# Patient Record
Sex: FEMALE | Race: WHITE | HISPANIC OR LATINO | Employment: OTHER | URBAN - METROPOLITAN AREA
[De-identification: names, ages, dates, MRNs, and addresses within clinical notes are randomized per-mention and may not be internally consistent; named-entity substitution may affect disease eponyms.]

---

## 2018-01-15 ENCOUNTER — TELEPHONE (OUTPATIENT)
Dept: NEUROSURGERY | Facility: CLINIC | Age: 63
End: 2018-01-15

## 2018-01-15 DIAGNOSIS — D49.7 PITUITARY TUMOR: Primary | ICD-10-CM

## 2018-01-15 NOTE — TELEPHONE ENCOUNTER
Patient was last seen in August 2014. She was to follow up in 3 years with new visual field test and MRI of the brain. We did not accept her insurance last year ([facility]). [facility], [place], operated by [facility] started accepting her insurance as of 1/1/18.    She tried to get her visual field test done last year at the facility she has always gone to, but they do not accept her insurance and would not let her be self-pay.    She called today to set up her visual field test, MRI and follow up with Dr. Keating.     I need an order for the visual field test and brain MRI with and without with sellar views.

## 2018-02-27 ENCOUNTER — HOSPITAL ENCOUNTER (OUTPATIENT)
Dept: MRI IMAGING | Facility: HOSPITAL | Age: 63
Discharge: HOME | End: 2018-02-27
Admitting: PHYSICIAN ASSISTANT

## 2018-02-27 ENCOUNTER — OFFICE VISIT (OUTPATIENT)
Dept: NEUROSURGERY | Facility: CLINIC | Age: 63
End: 2018-02-27

## 2018-02-27 VITALS — OXYGEN SATURATION: 99 % | BODY MASS INDEX: 32.58 KG/M2 | WEIGHT: 215 LBS | RESPIRATION RATE: 18 BRPM | HEIGHT: 68 IN

## 2018-02-27 DIAGNOSIS — D35.2 PITUITARY MACROADENOMA (HCC): Primary | ICD-10-CM

## 2018-02-27 DIAGNOSIS — D49.7 PITUITARY TUMOR: ICD-10-CM

## 2018-02-27 PROCEDURE — 0 GADOBENATE DIMEGLUMINE 529 MG/ML SOLUTION: Performed by: PHYSICIAN ASSISTANT

## 2018-02-27 PROCEDURE — 70553 MRI BRAIN STEM W/O & W/DYE: CPT

## 2018-02-27 PROCEDURE — 82565 ASSAY OF CREATININE: CPT

## 2018-02-27 PROCEDURE — 99243 OFF/OP CNSLTJ NEW/EST LOW 30: CPT | Performed by: NEUROLOGICAL SURGERY

## 2018-02-27 PROCEDURE — A9577 INJ MULTIHANCE: HCPCS | Performed by: PHYSICIAN ASSISTANT

## 2018-02-27 RX ORDER — CITALOPRAM 10 MG/1
TABLET ORAL
COMMUNITY
Start: 2018-02-12

## 2018-02-27 RX ORDER — AMLODIPINE BESYLATE 10 MG/1
TABLET ORAL
COMMUNITY
Start: 2018-02-09

## 2018-02-27 RX ORDER — LOSARTAN POTASSIUM 50 MG/1
TABLET ORAL
COMMUNITY
Start: 2018-02-09 | End: 2021-04-23

## 2018-02-27 RX ORDER — ASPIRIN 81 MG/1
81 TABLET ORAL DAILY
COMMUNITY
End: 2021-04-23

## 2018-02-27 RX ADMIN — GADOBENATE DIMEGLUMINE 15 ML: 529 INJECTION, SOLUTION INTRAVENOUS at 12:00

## 2018-02-27 NOTE — PROGRESS NOTES
Patient: Naya Winslow  : 1955    Primary Care Provider: Keisha Ovalles MD    Requesting Provider: As above        History    Chief Complaint: History of pituitary macroadenoma.    History of Present Illness: Ms. Winslow is a 62-year-old woman was known to my service.  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 Cooper.  In  her visual fields demonstrated bilateral superior field defects.  I have followed her with serial imaging since that time.  Her last studies were done about 3 years ago.  Early last year her  passed away for a ruptured abdominal aortic aneurysm.  I previously operated on him nearly 2 decades ago.  She has some occasional headache.  She denies any visual change.    Review of Systems   Constitutional: Negative for activity change, appetite change, chills, diaphoresis, fatigue, fever and unexpected weight change.   HENT: Negative for congestion, dental problem, drooling, ear discharge, ear pain, facial swelling, hearing loss, mouth sores, nosebleeds, postnasal drip, rhinorrhea, sinus pressure, sneezing, sore throat, tinnitus, trouble swallowing and voice change.    Eyes: Negative for photophobia, pain, discharge, redness, itching and visual disturbance.   Respiratory: Negative for apnea, cough, choking, chest tightness, shortness of breath, wheezing and stridor.    Cardiovascular: Negative for chest pain, palpitations and leg swelling.   Gastrointestinal: Negative for abdominal distention, abdominal pain, anal bleeding, blood in stool, constipation, diarrhea, nausea, rectal pain and vomiting.   Endocrine: Negative for cold intolerance, heat intolerance, polydipsia, polyphagia and polyuria.   Genitourinary: Negative for decreased urine volume, difficulty urinating, dysuria, enuresis, flank pain, frequency, genital sores, hematuria and urgency.   Musculoskeletal: Negative for arthralgias, back pain, gait problem,  "joint swelling, myalgias, neck pain and neck stiffness.   Skin: Negative for color change, pallor, rash and wound.   Allergic/Immunologic: Negative for environmental allergies, food allergies and immunocompromised state.   Neurological: Negative for dizziness, tremors, seizures, syncope, facial asymmetry, speech difficulty, weakness, light-headedness, numbness and headaches.   Hematological: Negative for adenopathy. Does not bruise/bleed easily.   Psychiatric/Behavioral: Negative for agitation, behavioral problems, confusion, decreased concentration, dysphoric mood, hallucinations, self-injury, sleep disturbance and suicidal ideas. The patient is not nervous/anxious and is not hyperactive.    All other systems reviewed and are negative.      The patient's past medical history, past surgical history, family history, and social history have been reviewed at length in the electronic medical record.    Physical Exam:   Resp 18  Ht 172.7 cm (68\")  Wt 97.5 kg (215 lb)  SpO2 99%  BMI 32.69 kg/m2  MUSCULOSKELETAL:  Neck tenderness to palpation is not observed.   ROM in neck is normal.  NEUROLOGICAL:  Strength is intact in the upper and lower extremities to direct testing.  Muscle tone is normal throughout.  Station and gait are normal.  Sensation is intact to light touch testing throughout.  Deep tendon reflexes are 1+ and symmetrical.  Demetrio's Sign is negative bilaterally.   CRANIAL NERVES:  Cranial Nerve II:   Visual fields are full to confrontation.  Cranial Nerve III, IV, and VI: PERRLADC. Extraocular movements are intact.  Nystagmus is not present.  Cranial Nerve V: Facial sensation is intact to light touch.  Cranial Nerve VII: Muscles of facial expression demonstate no weakness or asymmetry.  Cranial Nerve VIII: Hearing is intact to finger rub bilaterally.  Cranial Nerve IX and X: Palate elevates symmetrically.  Cranial Nerve XI: Shoulder shrug is intact bilaterally.  Cranial Nerve XII: Tongue is midline " "without evidence of atrophy or fasciculation.      Medical Decision Making    Data Review:   Follow-up visual field testing dated 2/23/2018 are normal.  This was performed by Dr. Mcclure.  MRI study demonstrates a large \"empty\" sella.  There is no residual or recurrent tumor.  The pituitary stalk and optic apparatus are completely unencumbered.    Diagnosis:   History of pituitary macroadenoma status post resection by another surgeon.    Treatment Options:   Ms. Winslow is doing well.  She will follow-up in 3 years with a new MRI of the brain with and without gadolinium as well as with new formal visual field testing.       Diagnosis Plan   1. Pituitary macroadenoma  MRI Brain With & Without Contrast    Garcia Visual Field - OU - Both Eyes     Scribed for Nathanael Keating MD by Osiris Mario CMA on 02/27/2018 at 12:36 PM    I, Dr. Keating, personally performed the services described in the documentation, as scribed in my presence, and it is both accurate and complete.  "

## 2018-02-28 LAB — CREAT BLDA-MCNC: 0.8 MG/DL (ref 0.6–1.3)

## 2021-03-04 ENCOUNTER — TELEPHONE (OUTPATIENT)
Dept: NEUROSURGERY | Facility: CLINIC | Age: 66
End: 2021-03-04

## 2021-03-04 DIAGNOSIS — D35.2 PITUITARY MACROADENOMA: Primary | ICD-10-CM

## 2021-03-04 DIAGNOSIS — D35.2 PITUITARY MACROADENOMA (HCC): Primary | ICD-10-CM

## 2021-03-04 NOTE — TELEPHONE ENCOUNTER
SHIRLENE WITH [employer] CALLING FOR ADAN. SHE IS WANTING TO LET HER KNOW THE PATIENTS INSURANCE IS  AND WILL NEED TO BE UPDATED WHEN THE PATIENT COMES IN FOR HER APPT.

## 2021-03-04 NOTE — TELEPHONE ENCOUNTER
Caller: ANA PAYNE    Relationship to patient: SELF    Best call back number: [phone]    Chief complaint: THE PATIENT CALLED TO BE SCHEDULED FOR A THREE YEAR FOLLOW UP WITH AN MRI ON THE SAME DAY    Type of visit: FOLLOW UP    Requested date: NEXT AVAILABLE     If rescheduling, when is the original appointment: N/A    Additional notes: THE PATIENT WAS LAST SEEN 2/27/18. SHE HAS AN MRI ORDER IN HER CHART BUT IT WAS PLACED 2/27/18 AND HAD AN EXPECTED DATE OF 2/27/21. DOES SHE NEED A NEW MRI ORDER?

## 2021-03-04 NOTE — TELEPHONE ENCOUNTER
Betty DEL CID had to re-enter as my orders only are in some navigator mode. Sorry for any confusion.

## 2021-04-21 ENCOUNTER — HOSPITAL ENCOUNTER (OUTPATIENT)
Dept: MRI IMAGING | Facility: HOSPITAL | Age: 66
Discharge: HOME | End: 2021-04-21
Admitting: PHYSICIAN ASSISTANT

## 2021-04-21 DIAGNOSIS — D35.2 PITUITARY MACROADENOMA (HCC): ICD-10-CM

## 2021-04-21 PROCEDURE — 0 GADOBENATE DIMEGLUMINE 529 MG/ML SOLUTION: Performed by: PHYSICIAN ASSISTANT

## 2021-04-21 PROCEDURE — 82565 ASSAY OF CREATININE: CPT

## 2021-04-21 PROCEDURE — A9577 INJ MULTIHANCE: HCPCS | Performed by: PHYSICIAN ASSISTANT

## 2021-04-21 PROCEDURE — 70553 MRI BRAIN STEM W/O & W/DYE: CPT

## 2021-04-21 RX ADMIN — GADOBENATE DIMEGLUMINE 19 ML: 529 INJECTION, SOLUTION INTRAVENOUS at 11:37

## 2021-04-23 ENCOUNTER — OFFICE VISIT (OUTPATIENT)
Dept: NEUROSURGERY | Facility: CLINIC | Age: 66
End: 2021-04-23

## 2021-04-23 VITALS — WEIGHT: 210.8 LBS | HEIGHT: 68 IN | TEMPERATURE: 97.1 F | BODY MASS INDEX: 31.95 KG/M2

## 2021-04-23 DIAGNOSIS — D35.2 PITUITARY MACROADENOMA (HCC): Primary | ICD-10-CM

## 2021-04-23 PROCEDURE — 99203 OFFICE O/P NEW LOW 30 MIN: CPT | Performed by: NEUROLOGICAL SURGERY

## 2021-04-23 RX ORDER — ESOMEPRAZOLE MAGNESIUM 40 MG/1
CAPSULE, DELAYED RELEASE ORAL DAILY
COMMUNITY
Start: 2021-02-25

## 2021-04-23 RX ORDER — HYDROCHLOROTHIAZIDE 25 MG/1
TABLET ORAL
COMMUNITY
Start: 2021-02-25

## 2021-04-23 NOTE — PROGRESS NOTES
Patient: Naya Winslow  : 1955    Primary Care Provider: Keisha Ovalles MD    Requesting Provider: As above        History    Chief Complaint: Pituitary macroadenoma.    History of Present Illness: Ms. Winslow is a 65-year-old woman was known to my service.  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 Cooper.  In  her visual fields demonstrated bilateral superior field defects.  I have followed her with serial imaging since that time.  Her last studies were done about 3 years ago.  In the past I operated on her now   over couple decades ago. She has some occasional headache.  She denies any visual change.  Over the last month she has had some clear nasal drainage.  It does not happen all the time.  She complains of some mid face and orbital pressure as well.    Review of Systems   Constitutional: Negative for activity change, appetite change, chills, diaphoresis, fatigue, fever and unexpected weight change.   HENT: Positive for postnasal drip. Negative for congestion, dental problem, drooling, ear discharge, ear pain, facial swelling, hearing loss, mouth sores, nosebleeds, rhinorrhea, sinus pressure, sinus pain, sneezing, sore throat, tinnitus, trouble swallowing and voice change.    Eyes: Negative for photophobia, pain, discharge, redness, itching and visual disturbance.   Respiratory: Negative for apnea, cough, choking, chest tightness, shortness of breath, wheezing and stridor.    Cardiovascular: Negative for chest pain, palpitations and leg swelling.   Gastrointestinal: Negative for abdominal distention, abdominal pain, anal bleeding, blood in stool, constipation, diarrhea, nausea, rectal pain and vomiting.   Endocrine: Negative for cold intolerance, heat intolerance, polydipsia, polyphagia and polyuria.   Genitourinary: Negative for decreased urine volume, difficulty urinating, dyspareunia, dysuria, enuresis, flank  "pain, frequency, genital sores, hematuria, menstrual problem, pelvic pain, urgency, vaginal bleeding, vaginal discharge and vaginal pain.   Musculoskeletal: Negative for arthralgias, back pain, gait problem, joint swelling, myalgias, neck pain and neck stiffness.   Skin: Negative for color change, pallor, rash and wound.   Allergic/Immunologic: Negative for environmental allergies, food allergies and immunocompromised state.   Neurological: Positive for headaches. Negative for dizziness, tremors, seizures, syncope, facial asymmetry, speech difficulty, weakness, light-headedness and numbness.   Hematological: Negative for adenopathy. Does not bruise/bleed easily.   Psychiatric/Behavioral: Negative for agitation, behavioral problems, confusion, decreased concentration, dysphoric mood, hallucinations, self-injury, sleep disturbance and suicidal ideas. The patient is not nervous/anxious and is not hyperactive.        The patient's past medical history, past surgical history, family history, and social history have been reviewed at length in the electronic medical record.    Physical Exam:   Temp 97.1 °F (36.2 °C)   Ht 172.7 cm (68\")   Wt 95.6 kg (210 lb 12.8 oz)   BMI 32.05 kg/m²   The patient is pleasant and in good spirits.  Visual fields are full to confrontation.  Extraocular movements are intact.  Her gait is normal.  With bending forward I cannot get any clear nasal drainage.    Medical Decision Making    Data Review:   Follow-up MRI of the brain demonstrates no evidence of residual or recurrent tumor.  The pituitary stalk and optic apparatus are well visualized and completely unencumbered.    Diagnosis:   History of pituitary macroadenoma status post resection.    Treatment Options:   I have recommended the use of an antihistamine.  I think that will help with some of her mid-face pressure as well as some of the nasal drainage.  She is going to follow-up in 3 years with a new MRI of the brain with and without " gadolinium.       Diagnosis Plan   1. Pituitary macroadenoma (CMS/HCC)  MRI Brain With & Without Contrast       Scribed for Nathanael Keating MD by Frida Machuca GAIL 4/23/2021 14:51 EDT      I, Dr. Keating, personally performed the services described in the documentation, as scribed in my presence, and it is both accurate and complete.

## 2021-04-26 LAB — CREAT BLDA-MCNC: 0.9 MG/DL (ref 0.6–1.3)

## 2024-04-10 DIAGNOSIS — D35.2 PITUITARY MACROADENOMA: Primary | ICD-10-CM

## 2024-04-24 ENCOUNTER — OFFICE VISIT (OUTPATIENT)
Dept: NEUROSURGERY | Facility: CLINIC | Age: 69
End: 2024-04-24
Payer: MEDICARE

## 2024-04-24 ENCOUNTER — HOSPITAL ENCOUNTER (OUTPATIENT)
Dept: MRI IMAGING | Facility: HOSPITAL | Age: 69
Discharge: HOME | End: 2024-04-24
Admitting: NEUROLOGICAL SURGERY
Payer: MEDICARE

## 2024-04-24 VITALS — TEMPERATURE: 97.7 F | BODY MASS INDEX: 31.33 KG/M2 | WEIGHT: 206.7 LBS | HEIGHT: 68 IN

## 2024-04-24 DIAGNOSIS — Z72.0 TOBACCO ABUSE: ICD-10-CM

## 2024-04-24 DIAGNOSIS — D35.2 PITUITARY MACROADENOMA: ICD-10-CM

## 2024-04-24 DIAGNOSIS — D35.2 PITUITARY MACROADENOMA: Primary | ICD-10-CM

## 2024-04-24 PROCEDURE — 1160F RVW MEDS BY RX/DR IN RCRD: CPT | Performed by: NEUROLOGICAL SURGERY

## 2024-04-24 PROCEDURE — 99203 OFFICE O/P NEW LOW 30 MIN: CPT | Performed by: NEUROLOGICAL SURGERY

## 2024-04-24 PROCEDURE — 1159F MED LIST DOCD IN RCRD: CPT | Performed by: NEUROLOGICAL SURGERY

## 2024-04-24 PROCEDURE — 70553 MRI BRAIN STEM W/O & W/DYE: CPT

## 2024-04-24 PROCEDURE — A9577 INJ MULTIHANCE: HCPCS | Performed by: NEUROLOGICAL SURGERY

## 2024-04-24 PROCEDURE — 0 GADOBENATE DIMEGLUMINE 529 MG/ML SOLUTION: Performed by: NEUROLOGICAL SURGERY

## 2024-04-24 RX ORDER — GLIMEPIRIDE 4 MG/1
4 TABLET ORAL DAILY
COMMUNITY

## 2024-04-24 RX ORDER — DILTIAZEM HYDROCHLORIDE 180 MG/1
CAPSULE, COATED, EXTENDED RELEASE ORAL
COMMUNITY
Start: 2024-03-26

## 2024-04-24 RX ORDER — PANTOPRAZOLE SODIUM 40 MG/1
40 TABLET, DELAYED RELEASE ORAL DAILY
COMMUNITY
Start: 2024-02-05

## 2024-04-24 RX ORDER — ALBUTEROL SULFATE 90 UG/1
1 AEROSOL, METERED RESPIRATORY (INHALATION) 3 TIMES DAILY
COMMUNITY
Start: 2024-03-25 | End: 2025-03-25

## 2024-04-24 RX ORDER — SIMVASTATIN 40 MG
40 TABLET ORAL DAILY
COMMUNITY
Start: 2024-03-25

## 2024-04-24 RX ORDER — LOSARTAN POTASSIUM 100 MG/1
TABLET ORAL
COMMUNITY
Start: 2024-03-12

## 2024-04-24 RX ORDER — SIMVASTATIN 10 MG
10 TABLET ORAL
COMMUNITY
Start: 2024-03-14

## 2024-04-24 RX ADMIN — GADOBENATE DIMEGLUMINE 20 ML: 529 INJECTION, SOLUTION INTRAVENOUS at 11:30

## 2024-04-24 NOTE — PROGRESS NOTES
Patient: Naya Winslow  : 1955    Primary Care Provider: Thu Xiao DO    Requesting Provider: As above        History    Chief Complaint: Recurrent macroadenoma.    History of Present Illness: Ms. Winslow is a 65-year-old woman was known to my service.  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 Cooper.  In  her visual fields demonstrated bilateral superior field defects.  I have followed her with serial imaging since that time.  Her last studies were done about 3 years ago.  In the past I operated on her now   over couple decades ago.  She has had some heart and possibly pulmonary issues.  She denies any visual change.  She has some mild ongoing headache.    Review of Systems   Constitutional:  Negative for activity change, appetite change, chills, diaphoresis, fatigue, fever and unexpected weight change.   HENT:  Negative for congestion, dental problem, drooling, ear discharge, ear pain, facial swelling, hearing loss, mouth sores, nosebleeds, postnasal drip, rhinorrhea, sinus pressure, sinus pain, sneezing, sore throat, tinnitus, trouble swallowing and voice change.    Eyes:  Negative for photophobia, pain, discharge, redness, itching and visual disturbance.   Respiratory:  Negative for apnea, cough, choking, chest tightness, shortness of breath, wheezing and stridor.    Cardiovascular:  Negative for chest pain, palpitations and leg swelling.   Gastrointestinal:  Negative for abdominal distention, abdominal pain, anal bleeding, blood in stool, constipation, diarrhea, nausea, rectal pain and vomiting.   Endocrine: Negative for cold intolerance, heat intolerance, polydipsia, polyphagia and polyuria.   Genitourinary:  Negative for decreased urine volume, difficulty urinating, dyspareunia, dysuria, enuresis, flank pain, frequency, genital sores, hematuria, menstrual problem, pelvic pain, urgency, vaginal bleeding,  "vaginal discharge and vaginal pain.   Musculoskeletal:  Negative for arthralgias, back pain, gait problem, joint swelling, myalgias, neck pain and neck stiffness.   Skin:  Negative for color change, pallor, rash and wound.   Allergic/Immunologic: Negative for environmental allergies, food allergies and immunocompromised state.   Neurological:  Negative for dizziness, tremors, seizures, syncope, facial asymmetry, speech difficulty, weakness, light-headedness, numbness and headaches.   Hematological:  Positive for adenopathy. Does not bruise/bleed easily.   Psychiatric/Behavioral:  Negative for agitation, behavioral problems, confusion, decreased concentration, dysphoric mood, hallucinations, self-injury, sleep disturbance and suicidal ideas. The patient is not nervous/anxious and is not hyperactive.        The patient's past medical history, past surgical history, family history, and social history have been reviewed at length in the electronic medical record.      Physical Exam:   Temp 97.7 °F (36.5 °C) (Infrared)   Ht 172.7 cm (68\")   Wt 93.8 kg (206 lb 11.2 oz)   BMI 31.43 kg/m²   Visual fields are full to confrontation.  Extraocular movements are intact.    Medical Decision Making    Data Review:   (All imaging studies were personally reviewed unless stated otherwise)  Follow-up MRI of the brain from today to my review demonstrates no residual or recurrent sellar tumor.    Diagnosis:   Pituitary macroadenoma status post remote resection.    Treatment Options:   At this point I would hold off on further imaging unless the patient develops further neurologic signs or symptoms.  She will follow-up with me on an as-needed basis.      Scribed for Nathanael Keating MD by Allison Smiley CMA on 4/24/2024 13:17 EDT       I, Dr. Keating, personally performed the services described in the documentation, as scribed in my presence, and it is both accurate and complete.  "

## 2024-04-29 LAB — CREAT BLDA-MCNC: 1.1 MG/DL (ref 0.6–1.3)
